# Patient Record
Sex: MALE | URBAN - METROPOLITAN AREA
[De-identification: names, ages, dates, MRNs, and addresses within clinical notes are randomized per-mention and may not be internally consistent; named-entity substitution may affect disease eponyms.]

---

## 2023-08-15 ENCOUNTER — INPATIENT (INPATIENT)
Facility: HOSPITAL | Age: 68
LOS: 1 days | Discharge: HOME | DRG: 313 | End: 2023-08-17
Attending: STUDENT IN AN ORGANIZED HEALTH CARE EDUCATION/TRAINING PROGRAM | Admitting: INTERNAL MEDICINE
Payer: COMMERCIAL

## 2023-08-15 VITALS
TEMPERATURE: 98 F | DIASTOLIC BLOOD PRESSURE: 76 MMHG | HEIGHT: 68 IN | HEART RATE: 78 BPM | RESPIRATION RATE: 16 BRPM | WEIGHT: 190.04 LBS | OXYGEN SATURATION: 100 % | SYSTOLIC BLOOD PRESSURE: 157 MMHG

## 2023-08-15 DIAGNOSIS — R07.9 CHEST PAIN, UNSPECIFIED: ICD-10-CM

## 2023-08-15 LAB
ALBUMIN SERPL ELPH-MCNC: 3.7 G/DL — SIGNIFICANT CHANGE UP (ref 3.3–5)
ALP SERPL-CCNC: 103 U/L — SIGNIFICANT CHANGE UP (ref 40–120)
ALT FLD-CCNC: 31 U/L — SIGNIFICANT CHANGE UP (ref 10–45)
ANION GAP SERPL CALC-SCNC: 10 MMOL/L — SIGNIFICANT CHANGE UP (ref 5–17)
APTT BLD: 33.2 SEC — SIGNIFICANT CHANGE UP (ref 24.5–35.6)
AST SERPL-CCNC: 22 U/L — SIGNIFICANT CHANGE UP (ref 10–40)
BASOPHILS # BLD AUTO: 0.06 K/UL — SIGNIFICANT CHANGE UP (ref 0–0.2)
BASOPHILS NFR BLD AUTO: 0.4 % — SIGNIFICANT CHANGE UP (ref 0–2)
BILIRUB SERPL-MCNC: 0.8 MG/DL — SIGNIFICANT CHANGE UP (ref 0.2–1.2)
BUN SERPL-MCNC: 14 MG/DL — SIGNIFICANT CHANGE UP (ref 7–23)
CALCIUM SERPL-MCNC: 9.8 MG/DL — SIGNIFICANT CHANGE UP (ref 8.4–10.5)
CHLORIDE SERPL-SCNC: 105 MMOL/L — SIGNIFICANT CHANGE UP (ref 96–108)
CO2 SERPL-SCNC: 25 MMOL/L — SIGNIFICANT CHANGE UP (ref 22–31)
CREAT SERPL-MCNC: 1.13 MG/DL — SIGNIFICANT CHANGE UP (ref 0.5–1.3)
D DIMER BLD IA.RAPID-MCNC: <150 NG/ML DDU — SIGNIFICANT CHANGE UP
EGFR: 70 ML/MIN/1.73M2 — SIGNIFICANT CHANGE UP
EOSINOPHIL # BLD AUTO: 0.28 K/UL — SIGNIFICANT CHANGE UP (ref 0–0.5)
EOSINOPHIL NFR BLD AUTO: 1.9 % — SIGNIFICANT CHANGE UP (ref 0–6)
GLUCOSE SERPL-MCNC: 122 MG/DL — HIGH (ref 70–99)
HCT VFR BLD CALC: 40.7 % — SIGNIFICANT CHANGE UP (ref 39–50)
HGB BLD-MCNC: 13.6 G/DL — SIGNIFICANT CHANGE UP (ref 13–17)
IMM GRANULOCYTES NFR BLD AUTO: 0.4 % — SIGNIFICANT CHANGE UP (ref 0–0.9)
INR BLD: 0.96 RATIO — SIGNIFICANT CHANGE UP (ref 0.85–1.18)
LYMPHOCYTES # BLD AUTO: 30.3 % — SIGNIFICANT CHANGE UP (ref 13–44)
LYMPHOCYTES # BLD AUTO: 4.5 K/UL — HIGH (ref 1–3.3)
MAGNESIUM SERPL-MCNC: 2.2 MG/DL — SIGNIFICANT CHANGE UP (ref 1.6–2.6)
MCHC RBC-ENTMCNC: 29.8 PG — SIGNIFICANT CHANGE UP (ref 27–34)
MCHC RBC-ENTMCNC: 33.4 GM/DL — SIGNIFICANT CHANGE UP (ref 32–36)
MCV RBC AUTO: 89.3 FL — SIGNIFICANT CHANGE UP (ref 80–100)
MONOCYTES # BLD AUTO: 1.2 K/UL — HIGH (ref 0–0.9)
MONOCYTES NFR BLD AUTO: 8.1 % — SIGNIFICANT CHANGE UP (ref 2–14)
NEUTROPHILS # BLD AUTO: 8.77 K/UL — HIGH (ref 1.8–7.4)
NEUTROPHILS NFR BLD AUTO: 58.9 % — SIGNIFICANT CHANGE UP (ref 43–77)
NRBC # BLD: 0 /100 WBCS — SIGNIFICANT CHANGE UP (ref 0–0)
NT-PROBNP SERPL-SCNC: 72 PG/ML — SIGNIFICANT CHANGE UP (ref 0–300)
PLATELET # BLD AUTO: 448 K/UL — HIGH (ref 150–400)
POTASSIUM SERPL-MCNC: 3.7 MMOL/L — SIGNIFICANT CHANGE UP (ref 3.5–5.3)
POTASSIUM SERPL-SCNC: 3.7 MMOL/L — SIGNIFICANT CHANGE UP (ref 3.5–5.3)
PROT SERPL-MCNC: 7.8 G/DL — SIGNIFICANT CHANGE UP (ref 6–8.3)
PROTHROM AB SERPL-ACNC: 11.3 SEC — SIGNIFICANT CHANGE UP (ref 9.5–13)
RBC # BLD: 4.56 M/UL — SIGNIFICANT CHANGE UP (ref 4.2–5.8)
RBC # FLD: 12.9 % — SIGNIFICANT CHANGE UP (ref 10.3–14.5)
SODIUM SERPL-SCNC: 140 MMOL/L — SIGNIFICANT CHANGE UP (ref 135–145)
TROPONIN I, HIGH SENSITIVITY RESULT: 4.7 NG/L — SIGNIFICANT CHANGE UP
TROPONIN I, HIGH SENSITIVITY RESULT: 5.7 NG/L — SIGNIFICANT CHANGE UP
WBC # BLD: 14.87 K/UL — HIGH (ref 3.8–10.5)
WBC # FLD AUTO: 14.87 K/UL — HIGH (ref 3.8–10.5)

## 2023-08-15 PROCEDURE — 93010 ELECTROCARDIOGRAM REPORT: CPT

## 2023-08-15 PROCEDURE — 71045 X-RAY EXAM CHEST 1 VIEW: CPT | Mod: 26

## 2023-08-15 PROCEDURE — 99222 1ST HOSP IP/OBS MODERATE 55: CPT

## 2023-08-15 PROCEDURE — 99285 EMERGENCY DEPT VISIT HI MDM: CPT

## 2023-08-15 RX ORDER — ASPIRIN/CALCIUM CARB/MAGNESIUM 324 MG
162 TABLET ORAL ONCE
Refills: 0 | Status: COMPLETED | OUTPATIENT
Start: 2023-08-15 | End: 2023-08-15

## 2023-08-15 RX ORDER — LOSARTAN POTASSIUM 100 MG/1
50 TABLET, FILM COATED ORAL DAILY
Refills: 0 | Status: DISCONTINUED | OUTPATIENT
Start: 2023-08-15 | End: 2023-08-17

## 2023-08-15 RX ORDER — METOPROLOL TARTRATE 50 MG
100 TABLET ORAL DAILY
Refills: 0 | Status: DISCONTINUED | OUTPATIENT
Start: 2023-08-15 | End: 2023-08-17

## 2023-08-15 RX ORDER — ACETAMINOPHEN 500 MG
650 TABLET ORAL EVERY 6 HOURS
Refills: 0 | Status: DISCONTINUED | OUTPATIENT
Start: 2023-08-15 | End: 2023-08-17

## 2023-08-15 RX ORDER — PANTOPRAZOLE SODIUM 20 MG/1
40 TABLET, DELAYED RELEASE ORAL
Refills: 0 | Status: DISCONTINUED | OUTPATIENT
Start: 2023-08-15 | End: 2023-08-17

## 2023-08-15 RX ORDER — REGADENOSON 0.08 MG/ML
0.4 INJECTION, SOLUTION INTRAVENOUS ONCE
Refills: 0 | Status: COMPLETED | OUTPATIENT
Start: 2023-08-15 | End: 2023-08-17

## 2023-08-15 RX ADMIN — Medication 162 MILLIGRAM(S): at 18:56

## 2023-08-15 NOTE — H&P ADULT - NSHPLABSRESULTS_GEN_ALL_CORE
13.6   14.87 )-----------( 448      ( 15 Aug 2023 18:55 )             40.7       08-15    140  |  105  |  14  ----------------------------<  122<H>  3.7   |  25  |  1.13    Ca    9.8      15 Aug 2023 18:55  Mg     2.2     08-15    TPro  7.8  /  Alb  3.7  /  TBili  0.8  /  DBili  x   /  AST  22  /  ALT  31  /  AlkPhos  103  08-15       Magnesium: 2.2 mg/dL [1.6 - 2.6] (08-15-23 @ 18:55)    LIVER FUNCTIONS - ( 15 Aug 2023 18:55 )  Alb: 3.7 g/dL / Pro: 7.8 g/dL / ALK PHOS: 103 U/L / ALT: 31 U/L / AST: 22 U/L / GGT: x               PT/INR - ( 15 Aug 2023 18:55 )   PT: 11.3 sec;   INR: 0.96 ratio         PTT - ( 15 Aug 2023 18:55 )  PTT:33.2 sec          Urinalysis Basic - ( 15 Aug 2023 18:55 )    Color: x / Appearance: x / SG: x / pH: x  Gluc: 122 mg/dL / Ketone: x  / Bili: x / Urobili: x   Blood: x / Protein: x / Nitrite: x   Leuk Esterase: x / RBC: x / WBC x   Sq Epi: x / Non Sq Epi: x / Bacteria: x        CAPILLARY BLOOD GLUCOSE        08-15 @ 18:55  <150 ng/mL DDU      EKG: personally rev. NSR at 76bpm, no acute ST changes.       CXR: wet read. personally rev.

## 2023-08-15 NOTE — H&P ADULT - NSHPPHYSICALEXAM_GEN_ALL_CORE
Vital Signs Last 24 Hrs  T(C): 37 (15 Aug 2023 21:56), Max: 37 (15 Aug 2023 21:56)  T(F): 98.6 (15 Aug 2023 21:56), Max: 98.6 (15 Aug 2023 21:56)  HR: 67 (15 Aug 2023 21:56) (67 - 78)  BP: 143/74 (15 Aug 2023 21:56) (143/74 - 157/76)  BP(mean): --  RR: 16 (15 Aug 2023 21:56) (16 - 16)  SpO2: 100% (15 Aug 2023 21:56) (100% - 100%)    Parameters below as of 15 Aug 2023 21:56  Patient On (Oxygen Delivery Method): room air      Daily Height in cm: 172.72 (15 Aug 2023 18:02)    Daily   CAPILLARY BLOOD GLUCOSE        I&O's Summary      GENERAL: NAD  HEAD:  Normocephalic  EYES: EOMI, PERRLA, conjunctiva and sclera clear  ENMT: No tonsillar erythema, exudates, or enlargement; Moist mucous membranes, No lesions  NECK: Supple, No JVD, no bruit, normal thyroid  NERVOUS SYSTEM:  Alert & Oriented X3, Good concentration; grossly  moves all fours; DTRs 2+ intact and symmetric  CHEST/LUNG: Clear to auscultation bilaterally; No rales, rhonchi, wheezing, or rubs  HEART: Regular rate and rhythm; No murmurs, rubs, or gallops  ABDOMEN: Soft, Nontender, Nondistended; Bowel sounds present  EXTREMITIES:  2+ Peripheral Pulses, No clubbing, cyanosis, or edema  LYMPH: No lymphadenopathy noted  SKIN: No rashes or lesions

## 2023-08-15 NOTE — H&P ADULT - NSHPREVIEWOFSYSTEMS_GEN_ALL_CORE
CONSTITUTIONAL: No fever, weight loss, or fatigue  EYES: No eye pain, visual disturbances, or discharge  ENMT:  No difficulty hearing, tinnitus, vertigo; No sinus or throat pain  NECK: No pain or stiffness  RESPIRATORY: No cough, wheezing, chills or hemoptysis; + shortness of breath  CARDIOVASCULAR: + chest pain, no palpitations, +dizziness, no  leg swelling  GASTROINTESTINAL: No abdominal or epigastric pain. + nausea, no vomiting, or hematemesis; No diarrhea or constipation. No melena or hematochezia.  GENITOURINARY: No dysuria, frequency, hematuria, or incontinence  NEUROLOGICAL: No headaches, memory loss, loss of strength, numbness, or tremors  SKIN: No itching, burning, rashes, or lesions   LYMPH NODES: No enlarged glands  ENDOCRINE: No heat or cold intolerance; No hair loss  MUSCULOSKELETAL: No joint pain or swelling; No muscle, back, or extremity pain  PSYCHIATRIC: No depression, anxiety, mood swings, or difficulty sleeping  HEME/LYMPH: No easy bruising, or bleeding gums  ALLERY AND IMMUNOLOGIC: No hives or eczema    IMPROVE VTE Individual Risk Assessment          RISK                                                          Points  [  ] Previous VTE                                                3  [  ] Thrombophilia                                             2  [  ] Lower limb paralysis                                   2        (unable to hold up >15 seconds)    [  ] Current Cancer                                             2         (within 6 months)  [  ] Immobilization > 24 hrs                              1  [  ] ICU/CCU stay > 24 hours                             1  [1  ] Age > 60                                                         1    IMPROVE VTE Score:         [ 1        ]    Total Risk Factor Score:    0 - 1:   Consider IPC  >2 - 3:  Thromboprophylaxis required (enoxaparin or SQ heparin)        >4:   High Risk: Thromboprophylaxis required (enoxaparin or SQ heparin), optional add IPC  **If CONTRAINDICATION to enoxaparin or SQ heparin, USE IPCs**

## 2023-08-15 NOTE — ED ADULT NURSE NOTE - NSFALLRISKINTERV_ED_ALL_ED

## 2023-08-15 NOTE — ED ADULT NURSE NOTE - OBJECTIVE STATEMENT
pt presents to ED c/o sob, L sided cp. Pt has had intermittent episodes of cp sob past 3 days. Pt son states sob lasting 5 hours prior to ED arrival. Pt pmh HTN. Pt denies f/c, trauma, chills.

## 2023-08-15 NOTE — H&P ADULT - ASSESSMENT
67M hx of HTN pw chest pain.    #Chest pain.  trend trops, serial EKGs, ECHO, card consult.  check nuclear stress (already ordered) in am if trop neg.   d-dimer neg.   ?GI related. start PPI.    #HTN  cont metoprolol and Losartan.    #DVT/GI proph    D/W Dr Farhan Geller at bedside updated. [phone] 67M hx of HTN pw chest pain.    #Chest pain.  trend trops, serial EKGs, ECHO, card consult.  check nuclear stress (already ordered) in am if trop neg.   d-dimer neg. check lipid profile  ?GI related. start PPI.    #HTN  cont metoprolol and Losartan.    #DVT/GI proph    D/W Dr Farhan Geller at bedside updated. [phone]

## 2023-08-15 NOTE — ED PROVIDER NOTE - PHYSICAL EXAMINATION
This message is for patient in regards to his/her appointment 08/15/22 at 10:00a   With CINDY Ruiz.      [facility] Policy: For the safety of all patients and staff members.     Patient Visitor policy: During this visit we're informing all patients that face mask are required.     If you have any questions or concerns please contact [phone]      Pt verbalized understanding and has  Confirmed  appt   General:     NAD, well-nourished, well-appearing  Eyes: PERRL  Head:     NC/AT, EOMI, oral mucosa moist  Neck:     trachea midline  Lungs:     CTA b/l, tachypnea in the high 20s low 30s  CVS:     RRR  Abd:     protuberant, soft and NT  Ext:   no deformities, no edema  Neuro: AAOx3

## 2023-08-15 NOTE — ED PROVIDER NOTE - CLINICAL SUMMARY MEDICAL DECISION MAKING FREE TEXT BOX
pt 68yo M hx HTN c/o SOB and left sided chest pain. 5 days ago pt had non radiating left sided cp at rest which resolved after a few minutes on its own. today had an episode this am which resolved on its own and now c/o constant discomfort and SOB. no edema. no cough. no fever, chills, no trauma. no recent illness. didn't take any meds for pain pt 66yo M hx HTN c/o SOB and left sided chest pain. 5 days ago pt had non radiating left sided cp at rest which resolved after a few minutes on its own. today had an episode this am which resolved on its own and now c/o constant discomfort and SOB. no edema. no cough. no fever, chills, no trauma. no recent illness. didn't take any meds for pain  2 trop neg. ekg NSR. will admit for ACS emerson pt 68yo M hx HTN c/o SOB and left sided chest pain. 5 days ago pt had non radiating left sided cp at rest which resolved after a few minutes on its own. today had an episode this am which resolved on its own and now c/o constant discomfort and SOB. no edema. no cough. no fever, chills, no trauma. no recent illness. didn't take any meds for pain  2 trop neg. ekg NSR. will admit for ACS emerson    Attending Xavier: 68 y/o M w/ PMH of HTN presenting w/ SOB. Seen w/ son. Agree w/ above documented HPI/PE/MDM. Pt w/ SOB and L side chest pain. Has been occurring for approx 1 week. Comes on randomly and self resolves. Today however symptoms came on and have been persistent. Pt overall well appearing, no acute distress. Lungs clear. HR regular. Will eval for ACS. Screen for PE. Eval for PNA but less likely. Eval for CHF. Given age, hx, and symptoms, recommended admission regardless of initial ED workup and pt agree. Plan for labs, imaging, EKG, meds. Will reassess the need for additional interventions as clinically warranted. Refer to any progress notes for updates on clinical course and as a continuation of this MDM.

## 2023-08-15 NOTE — ED PROVIDER NOTE - OBJECTIVE STATEMENT
pt 66yo M hx HTN c/o SOB and left sided chest pain. 5 days ago pt had non radiating left sided cp at rest which resolved after a few minutes on its own. today had an episode this am which resolved on its own and now c/o constant discomfort and SOB. no edema. no cough. no fever, chills, no trauma. no recent illness. didn't take any meds for pain

## 2023-08-15 NOTE — ED PROVIDER NOTE - ATTENDING APP SHARED VISIT CONTRIBUTION OF CARE
Attending Xavier: I performed a history and physical exam of the patient and discussed their management with the TAMIE. I have reviewed the TAMIE note and agree with the documented findings and plan of care, except as noted. This was a shared visit with an TAMIE. I reviewed and verified the documentation and independently performed my own history/exam/medical decision making. My medical decision making and observations are found above. Please refer to any progress notes for updates on clinical course. My notes supersedes the above ACP note in case of discrepancy

## 2023-08-15 NOTE — H&P ADULT - HISTORY OF PRESENT ILLNESS
67M hx of HTN pw chest pain. Pt related episode of moderate to severe chest pressure 8/10 5 days ago started 30min after eating breakfast associated with SOB, nausea, mild diaphoresis, weakness lasted about 30 min and resolved on its own. Recurrent episode also after breakfast 2 days ago lasting about an hr with same sxs. Today after breakfast at 10am with recurrent sxs but unresolved after several hrs and came to ED. Sxs much milder now but sensation of abd bloating and constant burping. Denied any GERD sxs. Related recent R lumbar radicular sxs and was on regimen of Naproxen, tramadol and gabapentin which he has been off for about a week due to improvement. In ED, afebrile P: 78 BP: 157/76 sat 100% on RA  WBC: 14.87 59%N Hgb; 13.6 cr: 1.17 d-dimer neg trop: 4.7 repeat 5.7 ma.2 BNP: 72

## 2023-08-16 ENCOUNTER — TRANSCRIPTION ENCOUNTER (OUTPATIENT)
Age: 68
End: 2023-08-16

## 2023-08-16 LAB
ALBUMIN SERPL ELPH-MCNC: 3.7 G/DL — SIGNIFICANT CHANGE UP (ref 3.3–5)
ALP SERPL-CCNC: 97 U/L — SIGNIFICANT CHANGE UP (ref 40–120)
ALT FLD-CCNC: 32 U/L — SIGNIFICANT CHANGE UP (ref 10–45)
ANION GAP SERPL CALC-SCNC: 9 MMOL/L — SIGNIFICANT CHANGE UP (ref 5–17)
AST SERPL-CCNC: 22 U/L — SIGNIFICANT CHANGE UP (ref 10–40)
BASOPHILS # BLD AUTO: 0.08 K/UL — SIGNIFICANT CHANGE UP (ref 0–0.2)
BASOPHILS NFR BLD AUTO: 0.6 % — SIGNIFICANT CHANGE UP (ref 0–2)
BILIRUB SERPL-MCNC: 1.2 MG/DL — SIGNIFICANT CHANGE UP (ref 0.2–1.2)
BUN SERPL-MCNC: 13 MG/DL — SIGNIFICANT CHANGE UP (ref 7–23)
CALCIUM SERPL-MCNC: 9 MG/DL — SIGNIFICANT CHANGE UP (ref 8.4–10.5)
CHLORIDE SERPL-SCNC: 106 MMOL/L — SIGNIFICANT CHANGE UP (ref 96–108)
CHOLEST SERPL-MCNC: 158 MG/DL — SIGNIFICANT CHANGE UP
CO2 SERPL-SCNC: 28 MMOL/L — SIGNIFICANT CHANGE UP (ref 22–31)
CREAT SERPL-MCNC: 1 MG/DL — SIGNIFICANT CHANGE UP (ref 0.5–1.3)
EGFR: 81 ML/MIN/1.73M2 — SIGNIFICANT CHANGE UP
EOSINOPHIL # BLD AUTO: 0.45 K/UL — SIGNIFICANT CHANGE UP (ref 0–0.5)
EOSINOPHIL NFR BLD AUTO: 3.6 % — SIGNIFICANT CHANGE UP (ref 0–6)
GLUCOSE SERPL-MCNC: 128 MG/DL — HIGH (ref 70–99)
HCT VFR BLD CALC: 44.8 % — SIGNIFICANT CHANGE UP (ref 39–50)
HCV AB S/CO SERPL IA: 0.12 S/CO — SIGNIFICANT CHANGE UP (ref 0–0.99)
HCV AB SERPL-IMP: SIGNIFICANT CHANGE UP
HDLC SERPL-MCNC: 40 MG/DL — LOW
HGB BLD-MCNC: 14.6 G/DL — SIGNIFICANT CHANGE UP (ref 13–17)
IMM GRANULOCYTES NFR BLD AUTO: 0.4 % — SIGNIFICANT CHANGE UP (ref 0–0.9)
LIPID PNL WITH DIRECT LDL SERPL: 98 MG/DL — SIGNIFICANT CHANGE UP
LYMPHOCYTES # BLD AUTO: 3.82 K/UL — HIGH (ref 1–3.3)
LYMPHOCYTES # BLD AUTO: 30.4 % — SIGNIFICANT CHANGE UP (ref 13–44)
MAGNESIUM SERPL-MCNC: 2.1 MG/DL — SIGNIFICANT CHANGE UP (ref 1.6–2.6)
MCHC RBC-ENTMCNC: 29.9 PG — SIGNIFICANT CHANGE UP (ref 27–34)
MCHC RBC-ENTMCNC: 32.6 GM/DL — SIGNIFICANT CHANGE UP (ref 32–36)
MCV RBC AUTO: 91.8 FL — SIGNIFICANT CHANGE UP (ref 80–100)
MONOCYTES # BLD AUTO: 1.17 K/UL — HIGH (ref 0–0.9)
MONOCYTES NFR BLD AUTO: 9.3 % — SIGNIFICANT CHANGE UP (ref 2–14)
NEUTROPHILS # BLD AUTO: 6.98 K/UL — SIGNIFICANT CHANGE UP (ref 1.8–7.4)
NEUTROPHILS NFR BLD AUTO: 55.7 % — SIGNIFICANT CHANGE UP (ref 43–77)
NON HDL CHOLESTEROL: 117 MG/DL — SIGNIFICANT CHANGE UP
NRBC # BLD: 0 /100 WBCS — SIGNIFICANT CHANGE UP (ref 0–0)
PLATELET # BLD AUTO: 479 K/UL — HIGH (ref 150–400)
POTASSIUM SERPL-MCNC: 4.3 MMOL/L — SIGNIFICANT CHANGE UP (ref 3.5–5.3)
POTASSIUM SERPL-SCNC: 4.3 MMOL/L — SIGNIFICANT CHANGE UP (ref 3.5–5.3)
PROT SERPL-MCNC: 7.4 G/DL — SIGNIFICANT CHANGE UP (ref 6–8.3)
RBC # BLD: 4.88 M/UL — SIGNIFICANT CHANGE UP (ref 4.2–5.8)
RBC # FLD: 12.9 % — SIGNIFICANT CHANGE UP (ref 10.3–14.5)
SODIUM SERPL-SCNC: 143 MMOL/L — SIGNIFICANT CHANGE UP (ref 135–145)
TRIGL SERPL-MCNC: 107 MG/DL — SIGNIFICANT CHANGE UP
TROPONIN I, HIGH SENSITIVITY RESULT: 9.4 NG/L — SIGNIFICANT CHANGE UP
WBC # BLD: 12.55 K/UL — HIGH (ref 3.8–10.5)
WBC # FLD AUTO: 12.55 K/UL — HIGH (ref 3.8–10.5)

## 2023-08-16 PROCEDURE — 99233 SBSQ HOSP IP/OBS HIGH 50: CPT

## 2023-08-16 PROCEDURE — 93306 TTE W/DOPPLER COMPLETE: CPT | Mod: 26

## 2023-08-16 PROCEDURE — 93010 ELECTROCARDIOGRAM REPORT: CPT

## 2023-08-16 PROCEDURE — 99222 1ST HOSP IP/OBS MODERATE 55: CPT

## 2023-08-16 RX ORDER — ASPIRIN/CALCIUM CARB/MAGNESIUM 324 MG
81 TABLET ORAL DAILY
Refills: 0 | Status: DISCONTINUED | OUTPATIENT
Start: 2023-08-16 | End: 2023-08-17

## 2023-08-16 RX ADMIN — PANTOPRAZOLE SODIUM 40 MILLIGRAM(S): 20 TABLET, DELAYED RELEASE ORAL at 00:41

## 2023-08-16 RX ADMIN — Medication 81 MILLIGRAM(S): at 21:51

## 2023-08-16 RX ADMIN — PANTOPRAZOLE SODIUM 40 MILLIGRAM(S): 20 TABLET, DELAYED RELEASE ORAL at 06:06

## 2023-08-16 RX ADMIN — LOSARTAN POTASSIUM 50 MILLIGRAM(S): 100 TABLET, FILM COATED ORAL at 06:06

## 2023-08-16 NOTE — DISCHARGE NOTE NURSING/CASE MANAGEMENT/SOCIAL WORK - PATIENT PORTAL LINK FT
You can access the FollowMyHealth Patient Portal offered by [facility] by registering at the following website: [email or web address]. By joining [facility]’s FollowMyHealth portal, you will also be able to view your health information using other applications (apps) compatible with our system.

## 2023-08-16 NOTE — CONSULT NOTE ADULT - ASSESSMENT
Assessment:  Dave Elena is a 67 year old man with past medical history of Hypertension presents with shortness of breath.    The patient reports that several days ago he started to experience shortness of breath at rest and on exertion. Denies chest pain. ECG consistent with normal sinus rhythm, no acute ischemic ST abnormalities. Troponins negative x 3, no signs of an acute coronary syndrome. D-dimer negative. Pro BNP negative, no signs of CHF on exam.     Recommendations:  [] Dyspnea: Due to age and risk factors, agree with checking echocardiogram to evaluate for structural heart disease. Based on echo results, plan for possible pharmacologic nuclear stress test in AM to evaluate for ischemic heart disease, please make NPO after MN tonight. Follow up Chest xray report.  [] Hypertension: BP elevated, resume home meds. Follow up echo     Updated Dr. Cooley. Will sign out this case to cardiologist to follow along tomorrow.    Aide Shore MD  Cardiology

## 2023-08-16 NOTE — CONSULT NOTE ADULT - SUBJECTIVE AND OBJECTIVE BOX
PIEDAD VAN  [id number]      HPI:  67M hx of HTN pw chest pain. Pt related episode of moderate to severe chest pressure 8/10 5 days ago started 30min after eating breakfast associated with SOB, nausea, mild diaphoresis, weakness lasted about 30 min and resolved on its own. Recurrent episode also after breakfast 2 days ago lasting about an hr with same sxs. Today after breakfast at 10am with recurrent sxs but unresolved after several hrs and came to ED. Sxs much milder now but sensation of abd bloating and constant burping. Denied any GERD sxs. Related recent R lumbar radicular sxs and was on regimen of Naproxen, tramadol and gabapentin which he has been off for about a week due to improvement. In ED, afebrile P: 78 BP: 157/76 sat 100% on RA  WBC: 14.87 59%N Hgb; 13.6 cr: 1.17 d-dimer neg trop: 4.7 repeat 5.7 ma.2 BNP: 72  (15 Aug 2023 22:37)        ALLERGIES:  No Known Allergies      PAST MEDICAL & SURGICAL HISTORY:  SOB (shortness of breath)            CURRENT MEDICATIONS:  acetaminophen     Tablet .. 650 milliGRAM(s) Oral every 6 hours PRN  losartan 50 milliGRAM(s) Oral daily  metoprolol succinate  milliGRAM(s) Oral daily  pantoprazole    Tablet 40 milliGRAM(s) Oral before breakfast  regadenoson Injectable 0.4 milliGRAM(s) IV Push once      SOCIAL HISTORY:      FAMILY HISTORY:  No pertinent family history in first degree relatives        ROS:  All 10 systems reviewed and positives noted in HPI    OBJECTIVE:    VITAL SIGNS:  Vital Signs Last 24 Hrs  T(C): 36.6 (16 Aug 2023 05:20), Max: 37 (15 Aug 2023 21:56)  T(F): 97.9 (16 Aug 2023 05:20), Max: 98.6 (15 Aug 2023 21:56)  HR: 85 (16 Aug 2023 05:20) (67 - 87)  BP: 168/77 (16 Aug 2023 05:20) (143/74 - 203/95)  BP(mean): --  RR: 17 (16 Aug 2023 05:20) (16 - 18)  SpO2: 97% (16 Aug 2023 05:20) (97% - 100%)    Parameters below as of 16 Aug 2023 05:20  Patient On (Oxygen Delivery Method): room air        PHYSICAL EXAM:  General: well appearing, no distress  HEENT: sclera anicteric  Neck: supple, no carotid bruits b/l  CVS: JVP ~ 7 cm H20, RRR, s1, s2, no murmurs/rubs/gallops  Chest: unlabored respirations, clear to auscultation b/l  Abdomen: non-distended  Extremities: no lower extremity edema b/l  Neuro: awake, alert & oriented x 3  Psych: normal affect      LABS:                        14.6   12.55 )-----------( 479      ( 16 Aug 2023 06:47 )             44.8     08-16    143  |  106  |  13  ----------------------------<  128<H>  4.3   |  28  |  1.00    Ca    9.0      16 Aug 2023 06:47  Mg     2.1     08-16    TPro  7.4  /  Alb  3.7  /  TBili  1.2  /  DBili  x   /  AST  22  /  ALT  32  /  AlkPhos  97  08-16        PT/INR - ( 15 Aug 2023 18:55 )   PT: 11.3 sec;   INR: 0.96 ratio         PTT - ( 15 Aug 2023 18:55 )  PTT:33.2 sec      ECG:      TTE:     DAVE ARAUJO  [id number]    [facility]  ID#: [id number]    HPI:    Dave Araujo is a 67 year old man with past medical history of Hypertension presents with shortness of breath.    The patient reports that several days ago he started to experience shortness of breath at rest and on exertion. Denies chest pain. Denies dizziness or syncope. Denies prior history of myocardial infarction.       ALLERGIES:  No Known Allergies      PAST MEDICAL HISTORY:  Hypertension     CURRENT MEDICATIONS:  acetaminophen     Tablet .. 650 milliGRAM(s) Oral every 6 hours PRN  losartan 50 milliGRAM(s) Oral daily  metoprolol succinate  milliGRAM(s) Oral daily  pantoprazole    Tablet 40 milliGRAM(s) Oral before breakfast  regadenoson Injectable 0.4 milliGRAM(s) IV Push once      ROS:  All 10 systems reviewed and positives noted in HPI    OBJECTIVE:    VITAL SIGNS:  Vital Signs Last 24 Hrs  T(C): 36.6 (16 Aug 2023 05:20), Max: 37 (15 Aug 2023 21:56)  T(F): 97.9 (16 Aug 2023 05:20), Max: 98.6 (15 Aug 2023 21:56)  HR: 85 (16 Aug 2023 05:20) (67 - 87)  BP: 168/77 (16 Aug 2023 05:20) (143/74 - 203/95)  BP(mean): --  RR: 17 (16 Aug 2023 05:20) (16 - 18)  SpO2: 97% (16 Aug 2023 05:20) (97% - 100%)    Parameters below as of 16 Aug 2023 05:20  Patient On (Oxygen Delivery Method): room air        PHYSICAL EXAM:  General: no distress  HEENT: sclera anicteric  Neck: supple  CVS: JVP ~ 7 cm H20, RRR, s1, s2, no murmurs/rubs/gallops  Chest: unlabored respirations, clear to auscultation b/l  Abdomen: non-distended  Extremities: no lower extremity edema b/l  Neuro: awake, alert & oriented  Psych: normal affect      LABS:                        14.6   12.55 )-----------( 479      ( 16 Aug 2023 06:47 )             44.8     08-16    143  |  106  |  13  ----------------------------<  128<H>  4.3   |  28  |  1.00    Ca    9.0      16 Aug 2023 06:47  Mg     2.1     08-16    TPro  7.4  /  Alb  3.7  /  TBili  1.2  /  DBili  x   /  AST  22  /  ALT  32  /  AlkPhos  97  08-16        PT/INR - ( 15 Aug 2023 18:55 )   PT: 11.3 sec;   INR: 0.96 ratio         PTT - ( 15 Aug 2023 18:55 )  PTT:33.2 sec        ECG (8/15/23): normal sinus rhythm

## 2023-08-16 NOTE — PROGRESS NOTE ADULT - SUBJECTIVE AND OBJECTIVE BOX
Patient is a 67y old  Male who presents with a chief complaint of chest pain (16 Aug 2023 10:25)      INTERVAL HPI:  OVERNIGHT EVENTS:  T(F): 97.7 (08-16-23 @ 12:24), Max: 98.6 (08-15-23 @ 21:56)  HR: 88 (08-16-23 @ 12:24) (67 - 88)  BP: 133/75 (08-16-23 @ 12:24) (133/75 - 203/95)  RR: 18 (08-16-23 @ 12:24) (16 - 18)  SpO2: 97% (08-16-23 @ 12:24) (97% - 100%)  I&O's Summary        PHYSICAL EXAM:  GENERAL: NAD, well-groomed, well-developed  HEAD:  Atraumatic, Normocephalic  EYES: EOMI, PERRLA, conjunctiva and sclera clear  ENMT: No tonsillar erythema, exudates, or enlargement; Moist mucous membranes, Good dentition, No lesions  NECK: Supple, No JVD, Normal thyroid  NERVOUS SYSTEM:  Alert & Oriented X3, Good concentration; Motor Strength 5/5 B/L upper and lower extremities; DTRs 2+ intact and symmetric  CHEST/LUNG: Clear to percussion bilaterally; No rales, rhonchi, wheezing, or rubs  HEART: Regular rate and rhythm; No murmurs, rubs, or gallops  ABDOMEN: Soft, Nontender, Nondistended; Bowel sounds present  EXTREMITIES:  2+ Peripheral Pulses, No clubbing, cyanosis, or edema  LYMPH: No lymphadenopathy noted  SKIN: No rashes or lesions    LABS:                        14.6   12.55 )-----------( 479      ( 16 Aug 2023 06:47 )             44.8     08-16    143  |  106  |  13  ----------------------------<  128<H>  4.3   |  28  |  1.00    Ca    9.0      16 Aug 2023 06:47  Mg     2.1     08-16    TPro  7.4  /  Alb  3.7  /  TBili  1.2  /  DBili  x   /  AST  22  /  ALT  32  /  AlkPhos  97  08-16    PT/INR - ( 15 Aug 2023 18:55 )   PT: 11.3 sec;   INR: 0.96 ratio         PTT - ( 15 Aug 2023 18:55 )  PTT:33.2 sec  Urinalysis Basic - ( 16 Aug 2023 06:47 )    Color: x / Appearance: x / SG: x / pH: x  Gluc: 128 mg/dL / Ketone: x  / Bili: x / Urobili: x   Blood: x / Protein: x / Nitrite: x   Leuk Esterase: x / RBC: x / WBC x   Sq Epi: x / Non Sq Epi: x / Bacteria: x      CAPILLARY BLOOD GLUCOSE                  MEDICATIONS  (STANDING):  losartan 50 milliGRAM(s) Oral daily  metoprolol succinate  milliGRAM(s) Oral daily  pantoprazole    Tablet 40 milliGRAM(s) Oral before breakfast  regadenoson Injectable 0.4 milliGRAM(s) IV Push once    MEDICATIONS  (PRN):  acetaminophen     Tablet .. 650 milliGRAM(s) Oral every 6 hours PRN Mild Pain (1 - 3)       Patient is a 67y old  Male who presents with a chief complaint of chest pain (16 Aug 2023 10:25)    [facility]  # [id number]    INTERVAL HPI: Pt seen and examined. States prior to admission had some irregularity in his breathing and was concerned along with some heartburn like symptoms. Vito Geller updated multiple times at bedside and on floor on plan of care. Pt denies any other acute complaints at this time.     OVERNIGHT EVENTS: none noted  T(F): 97.7 (08-16-23 @ 12:24), Max: 98.6 (08-15-23 @ 21:56)  HR: 88 (08-16-23 @ 12:24) (67 - 88)  BP: 133/75 (08-16-23 @ 12:24) (133/75 - 203/95)  RR: 18 (08-16-23 @ 12:24) (16 - 18)  SpO2: 97% (08-16-23 @ 12:24) (97% - 100%)  I&O's Summary        PHYSICAL EXAM:  GENERAL: NAD, obese  HEAD:  Normocephalic  EYES: EOMI, conjunctiva and sclera clear  ENMT:  Moist mucous membranes, No lesions  NECK: Supple, No JVD, no bruit,   NERVOUS SYSTEM:  Alert & Oriented X3, Good concentration; grossly  moves all fours; DTRs 2+ intact and symmetric  CHEST/LUNG: Clear to auscultation bilaterally; No rales, rhonchi, wheezing, or rubs  HEART: Regular rate and rhythm; No murmurs, rubs, or gallops  ABDOMEN: Soft, Nontender, Nondistended; Bowel sounds present  EXTREMITIES:  2+ Peripheral Pulses, No clubbing, cyanosis, or edema  SKIN: No rashes or lesions    LABS:                        14.6   12.55 )-----------( 479      ( 16 Aug 2023 06:47 )             44.8     08-16    143  |  106  |  13  ----------------------------<  128<H>  4.3   |  28  |  1.00    Ca    9.0      16 Aug 2023 06:47  Mg     2.1     08-16    TPro  7.4  /  Alb  3.7  /  TBili  1.2  /  DBili  x   /  AST  22  /  ALT  32  /  AlkPhos  97  08-16    PT/INR - ( 15 Aug 2023 18:55 )   PT: 11.3 sec;   INR: 0.96 ratio         PTT - ( 15 Aug 2023 18:55 )  PTT:33.2 sec  Urinalysis Basic - ( 16 Aug 2023 06:47 )    Color: x / Appearance: x / SG: x / pH: x  Gluc: 128 mg/dL / Ketone: x  / Bili: x / Urobili: x   Blood: x / Protein: x / Nitrite: x   Leuk Esterase: x / RBC: x / WBC x   Sq Epi: x / Non Sq Epi: x / Bacteria: x      CAPILLARY BLOOD GLUCOSE                  MEDICATIONS  (STANDING):  losartan 50 milliGRAM(s) Oral daily  metoprolol succinate  milliGRAM(s) Oral daily  pantoprazole    Tablet 40 milliGRAM(s) Oral before breakfast  regadenoson Injectable 0.4 milliGRAM(s) IV Push once    MEDICATIONS  (PRN):  acetaminophen     Tablet .. 650 milliGRAM(s) Oral every 6 hours PRN Mild Pain (1 - 3)

## 2023-08-16 NOTE — PATIENT PROFILE ADULT - FUNCTIONAL ASSESSMENT - DAILY ACTIVITY 4.
Called patient to schedule a screening mammogram PATIENTPHONEMESSAGE: left message.-          4 = No assist / stand by assistance

## 2023-08-16 NOTE — DISCHARGE NOTE NURSING/CASE MANAGEMENT/SOCIAL WORK - NSDCFUADDAPPT_GEN_ALL_CORE_FT
We made you a hospital followup appointment with Dr. Arteaga on 8/21/23 at 1:00pm, office #[phone].

## 2023-08-16 NOTE — PROGRESS NOTE ADULT - ASSESSMENT
67M hx of HTN pw chest pain.    #Chest pain.  trend trops, serial EKGs, ECHO, card consult.  cardio to consider pharm stress test tomorrow pending echo read, made npo tonight  ddime neg, lipid panel reviewed, check a1c, cont ppi,    #HTN  cont metoprolol and Losartan.    #DVT/GI proph    D/W Dr Shore cardio  Son Yimi at bedside updated. [phone]

## 2023-08-17 ENCOUNTER — TRANSCRIPTION ENCOUNTER (OUTPATIENT)
Age: 68
End: 2023-08-17

## 2023-08-17 VITALS
DIASTOLIC BLOOD PRESSURE: 84 MMHG | SYSTOLIC BLOOD PRESSURE: 135 MMHG | TEMPERATURE: 98 F | RESPIRATION RATE: 17 BRPM | HEART RATE: 83 BPM | OXYGEN SATURATION: 97 %

## 2023-08-17 DIAGNOSIS — K21.9 GASTRO-ESOPHAGEAL REFLUX DISEASE WITHOUT ESOPHAGITIS: ICD-10-CM

## 2023-08-17 DIAGNOSIS — R07.9 CHEST PAIN, UNSPECIFIED: ICD-10-CM

## 2023-08-17 DIAGNOSIS — I10 ESSENTIAL (PRIMARY) HYPERTENSION: ICD-10-CM

## 2023-08-17 LAB
A1C WITH ESTIMATED AVERAGE GLUCOSE RESULT: 6.2 % — HIGH (ref 4–5.6)
ALBUMIN SERPL ELPH-MCNC: 3.6 G/DL — SIGNIFICANT CHANGE UP (ref 3.3–5)
ALP SERPL-CCNC: 97 U/L — SIGNIFICANT CHANGE UP (ref 40–120)
ALT FLD-CCNC: 29 U/L — SIGNIFICANT CHANGE UP (ref 10–45)
ANION GAP SERPL CALC-SCNC: 12 MMOL/L — SIGNIFICANT CHANGE UP (ref 5–17)
AST SERPL-CCNC: 22 U/L — SIGNIFICANT CHANGE UP (ref 10–40)
BASOPHILS # BLD AUTO: 0.06 K/UL — SIGNIFICANT CHANGE UP (ref 0–0.2)
BASOPHILS NFR BLD AUTO: 0.5 % — SIGNIFICANT CHANGE UP (ref 0–2)
BILIRUB SERPL-MCNC: 1.2 MG/DL — SIGNIFICANT CHANGE UP (ref 0.2–1.2)
BUN SERPL-MCNC: 10 MG/DL — SIGNIFICANT CHANGE UP (ref 7–23)
CALCIUM SERPL-MCNC: 9.3 MG/DL — SIGNIFICANT CHANGE UP (ref 8.4–10.5)
CHLORIDE SERPL-SCNC: 105 MMOL/L — SIGNIFICANT CHANGE UP (ref 96–108)
CO2 SERPL-SCNC: 23 MMOL/L — SIGNIFICANT CHANGE UP (ref 22–31)
CREAT SERPL-MCNC: 1 MG/DL — SIGNIFICANT CHANGE UP (ref 0.5–1.3)
EGFR: 81 ML/MIN/1.73M2 — SIGNIFICANT CHANGE UP
EOSINOPHIL # BLD AUTO: 0.34 K/UL — SIGNIFICANT CHANGE UP (ref 0–0.5)
EOSINOPHIL NFR BLD AUTO: 2.7 % — SIGNIFICANT CHANGE UP (ref 0–6)
ESTIMATED AVERAGE GLUCOSE: 131 MG/DL — HIGH (ref 68–114)
GLUCOSE SERPL-MCNC: 134 MG/DL — HIGH (ref 70–99)
HCT VFR BLD CALC: 43.2 % — SIGNIFICANT CHANGE UP (ref 39–50)
HGB BLD-MCNC: 14.4 G/DL — SIGNIFICANT CHANGE UP (ref 13–17)
IMM GRANULOCYTES NFR BLD AUTO: 0.2 % — SIGNIFICANT CHANGE UP (ref 0–0.9)
LYMPHOCYTES # BLD AUTO: 26.6 % — SIGNIFICANT CHANGE UP (ref 13–44)
LYMPHOCYTES # BLD AUTO: 3.32 K/UL — HIGH (ref 1–3.3)
MCHC RBC-ENTMCNC: 30.3 PG — SIGNIFICANT CHANGE UP (ref 27–34)
MCHC RBC-ENTMCNC: 33.3 GM/DL — SIGNIFICANT CHANGE UP (ref 32–36)
MCV RBC AUTO: 90.8 FL — SIGNIFICANT CHANGE UP (ref 80–100)
MONOCYTES # BLD AUTO: 0.96 K/UL — HIGH (ref 0–0.9)
MONOCYTES NFR BLD AUTO: 7.7 % — SIGNIFICANT CHANGE UP (ref 2–14)
NEUTROPHILS # BLD AUTO: 7.77 K/UL — HIGH (ref 1.8–7.4)
NEUTROPHILS NFR BLD AUTO: 62.3 % — SIGNIFICANT CHANGE UP (ref 43–77)
NRBC # BLD: 0 /100 WBCS — SIGNIFICANT CHANGE UP (ref 0–0)
PLATELET # BLD AUTO: 444 K/UL — HIGH (ref 150–400)
POTASSIUM SERPL-MCNC: 3.5 MMOL/L — SIGNIFICANT CHANGE UP (ref 3.5–5.3)
POTASSIUM SERPL-SCNC: 3.5 MMOL/L — SIGNIFICANT CHANGE UP (ref 3.5–5.3)
PROT SERPL-MCNC: 7.6 G/DL — SIGNIFICANT CHANGE UP (ref 6–8.3)
RBC # BLD: 4.76 M/UL — SIGNIFICANT CHANGE UP (ref 4.2–5.8)
RBC # FLD: 13.2 % — SIGNIFICANT CHANGE UP (ref 10.3–14.5)
SODIUM SERPL-SCNC: 140 MMOL/L — SIGNIFICANT CHANGE UP (ref 135–145)
WBC # BLD: 12.48 K/UL — HIGH (ref 3.8–10.5)
WBC # FLD AUTO: 12.48 K/UL — HIGH (ref 3.8–10.5)

## 2023-08-17 PROCEDURE — 84484 ASSAY OF TROPONIN QUANT: CPT

## 2023-08-17 PROCEDURE — 85025 COMPLETE CBC W/AUTO DIFF WBC: CPT

## 2023-08-17 PROCEDURE — 93005 ELECTROCARDIOGRAM TRACING: CPT

## 2023-08-17 PROCEDURE — 83036 HEMOGLOBIN GLYCOSYLATED A1C: CPT

## 2023-08-17 PROCEDURE — 78452 HT MUSCLE IMAGE SPECT MULT: CPT | Mod: 26

## 2023-08-17 PROCEDURE — 80061 LIPID PANEL: CPT

## 2023-08-17 PROCEDURE — 93018 CV STRESS TEST I&R ONLY: CPT

## 2023-08-17 PROCEDURE — 85610 PROTHROMBIN TIME: CPT

## 2023-08-17 PROCEDURE — 36415 COLL VENOUS BLD VENIPUNCTURE: CPT

## 2023-08-17 PROCEDURE — 71045 X-RAY EXAM CHEST 1 VIEW: CPT

## 2023-08-17 PROCEDURE — 80053 COMPREHEN METABOLIC PANEL: CPT

## 2023-08-17 PROCEDURE — 36000 PLACE NEEDLE IN VEIN: CPT

## 2023-08-17 PROCEDURE — 93306 TTE W/DOPPLER COMPLETE: CPT

## 2023-08-17 PROCEDURE — 99239 HOSP IP/OBS DSCHRG MGMT >30: CPT

## 2023-08-17 PROCEDURE — 99232 SBSQ HOSP IP/OBS MODERATE 35: CPT | Mod: 25

## 2023-08-17 PROCEDURE — A9500: CPT

## 2023-08-17 PROCEDURE — 99285 EMERGENCY DEPT VISIT HI MDM: CPT

## 2023-08-17 PROCEDURE — 93017 CV STRESS TEST TRACING ONLY: CPT

## 2023-08-17 PROCEDURE — 83880 ASSAY OF NATRIURETIC PEPTIDE: CPT

## 2023-08-17 PROCEDURE — 93016 CV STRESS TEST SUPVJ ONLY: CPT

## 2023-08-17 PROCEDURE — 86803 HEPATITIS C AB TEST: CPT

## 2023-08-17 PROCEDURE — 78452 HT MUSCLE IMAGE SPECT MULT: CPT

## 2023-08-17 PROCEDURE — 85730 THROMBOPLASTIN TIME PARTIAL: CPT

## 2023-08-17 PROCEDURE — 83735 ASSAY OF MAGNESIUM: CPT

## 2023-08-17 PROCEDURE — 85379 FIBRIN DEGRADATION QUANT: CPT

## 2023-08-17 RX ORDER — PANTOPRAZOLE SODIUM 20 MG/1
1 TABLET, DELAYED RELEASE ORAL
Qty: 14 | Refills: 0
Start: 2023-08-17 | End: 2023-08-30

## 2023-08-17 RX ADMIN — LOSARTAN POTASSIUM 50 MILLIGRAM(S): 100 TABLET, FILM COATED ORAL at 05:38

## 2023-08-17 RX ADMIN — Medication 100 MILLIGRAM(S): at 05:39

## 2023-08-17 RX ADMIN — PANTOPRAZOLE SODIUM 40 MILLIGRAM(S): 20 TABLET, DELAYED RELEASE ORAL at 05:39

## 2023-08-17 RX ADMIN — REGADENOSON 0.4 MILLIGRAM(S): 0.08 INJECTION, SOLUTION INTRAVENOUS at 10:55

## 2023-08-17 NOTE — DISCHARGE NOTE PROVIDER - NSDCCAREPROVSEEN_GEN_ALL_CORE_FT
all other ROS negative except as per HPI Neel, Placido Mckinley, Wilian Cooley, Nathanael Tenroio, [place] Dillon

## 2023-08-17 NOTE — DISCHARGE NOTE PROVIDER - NSDCFUADDAPPT_GEN_ALL_CORE_FT
We made you a hospital followup appointment with Dr. Arteaga on 8/21/23 at 1:00pm, office #[phone].

## 2023-08-17 NOTE — PROGRESS NOTE ADULT - SUBJECTIVE AND OBJECTIVE BOX
Follow up for sob  SUBJ:    pain free sob free    PMH  SOB (shortness of breath)        MEDICATIONS  (STANDING):  aspirin  chewable 81 milliGRAM(s) Oral daily  losartan 50 milliGRAM(s) Oral daily  metoprolol succinate  milliGRAM(s) Oral daily  pantoprazole    Tablet 40 milliGRAM(s) Oral before breakfast    MEDICATIONS  (PRN):  acetaminophen     Tablet .. 650 milliGRAM(s) Oral every 6 hours PRN Mild Pain (1 - 3)        PHYSICAL EXAM:  Vital Signs Last 24 Hrs  T(C): 36.7 (17 Aug 2023 12:57), Max: 36.9 (17 Aug 2023 05:10)  T(F): 98 (17 Aug 2023 12:57), Max: 98.5 (17 Aug 2023 05:10)  HR: 83 (17 Aug 2023 12:57) (80 - 91)  BP: 135/84 (17 Aug 2023 12:57) (135/84 - 184/83)  BP(mean): 117 (17 Aug 2023 05:10) (117 - 117)  RR: 17 (17 Aug 2023 12:57) (16 - 18)  SpO2: 97% (17 Aug 2023 12:57) (97% - 98%)    Parameters below as of 17 Aug 2023 12:57  Patient On (Oxygen Delivery Method): room air        GENERAL: NAD, well-groomed, well-developed  HEAD:  Atraumatic, Normocephalic  EYES: EOMI, PERRLA, conjunctiva and sclera clear  ENT: Moist mucous membranes,  NECK: Supple, No JVD, no bruits  CHEST/LUNG: Clear to percussion bilaterally; No rales, rhonchi, wheezing, or rubs  HEART: Regular rate and rhythm; No murmurs, rubs, or gallops PMI non displaced.  ABDOMEN: Soft, Nontender, Nondistended; Bowel sounds present  EXTREMITIES:  2+ Peripheral Pulses, No clubbing, cyanosis, or edema  SKIN: No rashes or lesions  NERVOUS SYSTEM:  Cranial Nerves II-XII intact      TELEMETRY:    ECG:  ECHO:    LABS:                        14.4   12.48 )-----------( 444      ( 17 Aug 2023 06:31 )             43.2     08-17    140  |  105  |  10  ----------------------------<  134<H>  3.5   |  23  |  1.00    Ca    9.3      17 Aug 2023 06:31  Mg     2.1     08-16    TPro  7.6  /  Alb  3.6  /  TBili  1.2  /  DBili  x   /  AST  22  /  ALT  29  /  AlkPhos  97  08-17    I&O's Summary    BNP    RADIOLOGY & ADDITIONAL STUDIES:    ECHO:    impression  tn 12.4 tel rsr ef 55-60 mpi neg for ischemia. discussed with dr mcarthur. and son at bedside. jim will follow with [facility] cardio in [facility] limitations of stress test discussed in detail consider angiography if clinically warranted

## 2023-08-17 NOTE — DISCHARGE NOTE PROVIDER - HOSPITAL COURSE
67M hx of HTN presented to Ed on 8/15/23 c/o episodes of chest pain at rest x 5 days. In ED, afebrile P: 78 BP: 157/76 sat 100% on RA WBC: 14.87 59%N Hgb: 13.6 Cr: 1.17 d-dimer neg trop: 4.7 repeat 5.7 ma.2 BNP: 72. EKG showed NSR. CXR with no pulmonary disease. Pt was admitted for ACS work up. Continued to trend Trop, which was negative x3 (4.7 > 5.7 > 9.4). Repeat EKG continued to show NSR. EKG with LVEF 65-70% and no wall motion abnormality. Cardiology was consulted, recommended stress test, which showed.........Home med metoprolol and losartan was continued during the hospitalization. On the day of discharge, pt is medically stable to be discharged back home.     Vital Signs Last 24 Hrs  T(C): 36.7 (17 Aug 2023 12:57), Max: 36.9 (17 Aug 2023 05:10)  T(F): 98 (17 Aug 2023 12:57), Max: 98.5 (17 Aug 2023 05:10)  HR: 83 (17 Aug 2023 12:57) (80 - 91)  BP: 135/84 (17 Aug 2023 12:57) (135/84 - 184/83)  BP(mean): 117 (17 Aug 2023 05:10) (117 - 117)  RR: 17 (17 Aug 2023 12:57) (16 - 18)  SpO2: 97% (17 Aug 2023 12:57) (97% - 98%)    O2 Parameters below as of 17 Aug 2023 12:57  Patient On (Oxygen Delivery Method): room air    PHYSICAL EXAM:  GENERAL: NAD, obese  HEAD:  Normocephalic  EYES: EOMI, conjunctiva and sclera clear  ENMT:  Moist mucous membranes, No lesions  NECK: Supple, No JVD, no bruit,   NERVOUS SYSTEM:  Alert & Oriented X3, Good concentration; grossly  moves all fours; DTRs 2+ intact and symmetric  CHEST/LUNG: Clear to auscultation bilaterally; No rales, rhonchi, wheezing, or rubs  HEART: Regular rate and rhythm; No murmurs, rubs, or gallops  ABDOMEN: Soft, Nontender, Nondistended; Bowel sounds present  EXTREMITIES:  2+ Peripheral Pulses, No clubbing, cyanosis, or edema  SKIN: No rashes or lesions             67M hx of HTN presented to Ed on 8/15/23 c/o episodes of chest pain (~30min) after eating associated with SOB, nausea, mild diaphoresis, and weakness x 5 days. In ED, afebrile P: 78 BP: 157/76 sat 100% on RA WBC: 14.87 59%N Hgb: 13.6 Cr: 1.17 d-dimer neg trop: 4.7 repeat 5.7 ma.2 BNP: 72. EKG showed NSR. CXR with no pulmonary disease. Pt was admitted for ACS work up. Continued to trend Trop, which was negative x3 (4.7 > 5.7 > 9.4). Repeat EKG continued to show NSR. EKG with LVEF 65-70% and no wall motion abnormality. Cardiology was consulted, recommended stress test, which showed......... Home med metoprolol and losartan was continued during the hospitalization. On the day of discharge, pt is medically stable to be discharged back home.     Vital Signs Last 24 Hrs  T(C): 36.7 (17 Aug 2023 12:57), Max: 36.9 (17 Aug 2023 05:10)  T(F): 98 (17 Aug 2023 12:57), Max: 98.5 (17 Aug 2023 05:10)  HR: 83 (17 Aug 2023 12:57) (80 - 91)  BP: 135/84 (17 Aug 2023 12:57) (135/84 - 184/83)  BP(mean): 117 (17 Aug 2023 05:10) (117 - 117)  RR: 17 (17 Aug 2023 12:57) (16 - 18)  SpO2: 97% (17 Aug 2023 12:57) (97% - 98%)    O2 Parameters below as of 17 Aug 2023 12:57  Patient On (Oxygen Delivery Method): room air    PHYSICAL EXAM:  GENERAL: NAD, obese  HEAD:  Normocephalic  EYES: EOMI, conjunctiva and sclera clear  ENMT:  Moist mucous membranes, No lesions  NECK: Supple, No JVD, no bruit,   NERVOUS SYSTEM:  Alert & Oriented X3, Good concentration; grossly  moves all fours; DTRs 2+ intact and symmetric  CHEST/LUNG: Clear to auscultation bilaterally; No rales, rhonchi, wheezing, or rubs  HEART: Regular rate and rhythm; No murmurs, rubs, or gallops  ABDOMEN: Soft, Nontender, Nondistended; Bowel sounds present  EXTREMITIES:  2+ Peripheral Pulses, No clubbing, cyanosis, or edema  SKIN: No rashes or lesions             67M hx of HTN presented to Ed on 8/15/23 c/o episodes of chest pain (~30min) after eating associated with SOB, nausea, mild diaphoresis, and weakness x 5 days. In ED, afebrile P: 78 BP: 157/76 sat 100% on RA WBC: 14.87 59%N Hgb: 13.6 Cr: 1.17 d-dimer neg trop: 4.7 repeat 5.7 ma.2 BNP: 72. EKG showed NSR. CXR with no pulmonary disease. Pt was admitted for ACS work up. Continued to trend Trop, which was negative x3 (4.7 > 5.7 > 9.4). Repeat EKG continued to show NSR. EKG with LVEF 65-70% and no wall motion abnormality. Cardiology was consulted, recommended nuclear stress test, which was negative. Home med metoprolol and losartan was continued during the hospitalization. On the day of discharge, pt is medically stable to be discharged back home.     Vital Signs Last 24 Hrs  T(C): 36.7 (17 Aug 2023 12:57), Max: 36.9 (17 Aug 2023 05:10)  T(F): 98 (17 Aug 2023 12:57), Max: 98.5 (17 Aug 2023 05:10)  HR: 83 (17 Aug 2023 12:57) (80 - 91)  BP: 135/84 (17 Aug 2023 12:57) (135/84 - 184/83)  BP(mean): 117 (17 Aug 2023 05:10) (117 - 117)  RR: 17 (17 Aug 2023 12:57) (16 - 18)  SpO2: 97% (17 Aug 2023 12:57) (97% - 98%)    O2 Parameters below as of 17 Aug 2023 12:57  Patient On (Oxygen Delivery Method): room air    PHYSICAL EXAM:  GENERAL: NAD, obese  HEAD:  Normocephalic  EYES: EOMI, conjunctiva and sclera clear  ENMT:  Moist mucous membranes, No lesions  NECK: Supple, No JVD, no bruit,   NERVOUS SYSTEM:  Alert & Oriented X3, Good concentration; grossly  moves all fours; DTRs 2+ intact and symmetric  CHEST/LUNG: Clear to auscultation bilaterally; No rales, rhonchi, wheezing, or rubs  HEART: Regular rate and rhythm; No murmurs, rubs, or gallops  ABDOMEN: Soft, Nontender, Nondistended; Bowel sounds present  EXTREMITIES:  2+ Peripheral Pulses, No clubbing, cyanosis, or edema  SKIN: No rashes or lesions

## 2023-08-17 NOTE — DISCHARGE NOTE PROVIDER - CARE PROVIDER_API CALL
Jenni, Tony  Phone: [phone]  Fax: (   )    -  Follow Up Time: 1 week   Tony Arteaga  Phone: [phone]  Fax: (   )    -  Follow Up Time: 1 week    Wilian Mckinley  Cardiology  [address], [address]  [place] [address]  Phone: [phone]  Fax: [phone]  Follow Up Time: 1 week

## 2023-08-17 NOTE — DISCHARGE NOTE PROVIDER - NSDCMRMEDTOKEN_GEN_ALL_CORE_FT
gabapentin 300 mg oral capsule: 1 cap(s) orally 2 times a day  losartan 50 mg oral tablet: 1 orally once a day  metoprolol succinate 100 mg oral capsule, extended release: 1 orally once a day   gabapentin 300 mg oral capsule: 1 cap(s) orally 2 times a day  losartan 50 mg oral tablet: 1 orally once a day  metoprolol succinate 100 mg oral capsule, extended release: 1 orally once a day  pantoprazole 40 mg oral delayed release tablet: 1 tab(s) orally once a day (before a meal)

## 2023-08-17 NOTE — DISCHARGE NOTE PROVIDER - CARE PROVIDERS DIRECT ADDRESSES
,DirectAddress_Unknown ,DirectAddress_Unknown,[email or web address]

## 2023-08-17 NOTE — DISCHARGE NOTE PROVIDER - PROVIDER TOKENS
FREE:[LAST:[Yousef],FIRST:[Tony],PHONE:[phone]],FAX:[(   )    -],FOLLOWUP:[1 week]] FREE:[LAST:[Yousef],FIRST:[Tony],PHONE:[phone]],FAX:[(   )    -],FOLLOWUP:[1 week]],PROVIDER:[TOKEN:[[id number]],FOLLOWUP:[1 week]]

## 2023-08-17 NOTE — DISCHARGE NOTE PROVIDER - NSDCCPCAREPLAN_GEN_ALL_CORE_FT
PRINCIPAL DISCHARGE DIAGNOSIS  Diagnosis: Chest pain  Assessment and Plan of Treatment: You were hospitalized due to chest pain concerning for acute coronay syndrome. The lab work and imaging studies, including nuclear stress test, did not indicate you have acute coronary syndrome. Continue taking pentaprozole as indicated. Please follow up with your primary care provider for further managment.      SECONDARY DISCHARGE DIAGNOSES  Diagnosis: Hypertension  Assessment and Plan of Treatment: Continue taking metoprolol and losartan as directed. Follow up with your primary provider for further management.     PRINCIPAL DISCHARGE DIAGNOSIS  Diagnosis: Chest pain  Assessment and Plan of Treatment: You were hospitalized due to chest pain concerning for acute coronay syndrome. The lab work and imaging studies, including nuclear stress test, did not indicate you have acute coronary syndrome. Continue taking pentaprozole as indicated. Please follow up with your primary care provider and cardiologist for further managment.  SEEK IMMEDIATE MEDICAL CARE IF YOU HAVE THE FOLLOWING SYMPTOMS: worsening chest pain, coughing up blood, unexplained back/neck/jaw pain, severe abdominal pain, dizziness or lightheadedness, shortness of breath, sweaty or clammy skin, vomiting, or racing heart beat. These symptoms may represent a serious problem that is an emergency. Do not wait to see if the symptoms will go away. Get medical help right away. Call your local emergency services (911 in the [place] Do not drive yourself to the hospital.        SECONDARY DISCHARGE DIAGNOSES  Diagnosis: Hypertension  Assessment and Plan of Treatment: Continue taking metoprolol and losartan as directed. Follow up with your primary provider for further management.

## 2023-09-11 RX ORDER — METOPROLOL TARTRATE 50 MG
1 TABLET ORAL
Refills: 0 | DISCHARGE

## 2023-09-11 RX ORDER — GABAPENTIN 400 MG/1
1 CAPSULE ORAL
Refills: 0 | DISCHARGE

## 2023-09-11 RX ORDER — LOSARTAN POTASSIUM 100 MG/1
1 TABLET, FILM COATED ORAL
Refills: 0 | DISCHARGE

## 2023-10-02 PROBLEM — Z00.00 ENCOUNTER FOR PREVENTIVE HEALTH EXAMINATION: Status: ACTIVE | Noted: 2023-10-02

## 2023-10-11 ENCOUNTER — APPOINTMENT (OUTPATIENT)
Dept: SPINE | Facility: CLINIC | Age: 68
End: 2023-10-11

## 2024-03-06 NOTE — ED PROVIDER NOTE - WET READ LAUNCH FT
Pt arrived via triage.  Pt c/o dental pain and difficulty swallowing after having 5 wisdom teeth pulled on last Thursday.  No medical hx, no travel/suspected exposure.  Pt also states unable to swallow, spitting and having nausea.  Symptoms started yesterday.  No travel/suspected exposure, A&Ox4.   There are no Wet Read(s) to document. There is 1 Wet Read(s) to document.

## 2024-03-29 PROBLEM — R06.02 SHORTNESS OF BREATH: Chronic | Status: ACTIVE | Noted: 2023-08-15

## 2024-04-09 ENCOUNTER — APPOINTMENT (OUTPATIENT)
Dept: NEUROLOGY | Facility: CLINIC | Age: 69
End: 2024-04-09
Payer: MEDICARE

## 2024-04-09 VITALS
HEIGHT: 67 IN | BODY MASS INDEX: 28.56 KG/M2 | DIASTOLIC BLOOD PRESSURE: 89 MMHG | HEART RATE: 72 BPM | WEIGHT: 182 LBS | SYSTOLIC BLOOD PRESSURE: 177 MMHG

## 2024-04-09 DIAGNOSIS — Z78.9 OTHER SPECIFIED HEALTH STATUS: ICD-10-CM

## 2024-04-09 DIAGNOSIS — Z86.79 PERSONAL HISTORY OF OTHER DISEASES OF THE CIRCULATORY SYSTEM: ICD-10-CM

## 2024-04-09 DIAGNOSIS — M54.16 RADICULOPATHY, LUMBAR REGION: ICD-10-CM

## 2024-04-09 PROCEDURE — G2211 COMPLEX E/M VISIT ADD ON: CPT

## 2024-04-09 PROCEDURE — 99205 OFFICE O/P NEW HI 60 MIN: CPT

## 2024-04-09 NOTE — REASON FOR VISIT
[Consultation] : a consultation visit [Family Member] : family member [FreeTextEntry1] : lumbar radiculopathy

## 2024-04-09 NOTE — DISCUSSION/SUMMARY
[FreeTextEntry1] : 68-year-old gentleman who sustained lumbar disc herniation at the level of L5 is here for initial consultation.  Patient continues to have symptoms localized to the L5 region however it is not clear if it is residuals from his previous radiculopathy versus new symptoms.  Will repeat an MRI of the lumbar spine.  Patient was advised to continue blood pressure medications and to follow-up with his PMD.  Further management of the lumbar radiculopathy pending review of imaging.  I spent the time noted on the day of this patient encounter preparing for, review of medical records,review of pertinent diagnostic studies, providing and documenting the above E/M service and counseling and educate patient on differential, workup, disease course, and treatment/management. Education was provided to the patient during this encounter. All questions and concerns were answered and addressed in detail. The patient verbalized understanding and agreed to plan. Patient was advised to continue to monitor for neurologic symptoms and to notify my office or go to the nearest emergency room if there are any changes. Any orders/referrals and communications were provided as well. Side effects of the above medications were discussed in detail including but not limited to applicable black box warning and teratogenicity as appropriate. Patient was advised to bring previous records to my office.

## 2024-04-09 NOTE — HISTORY OF PRESENT ILLNESS
[FreeTextEntry1] : 68-year-old gentleman who around July 20 23rd lifted a heavy box and soon had symptoms of disc herniation including diaphoresis and back pain.  Patient was brought by ambulance to near [facility] where MRI showed disc herniation at the level of L5 and no intervention was recommended but he was sent for physical therapy.  Patient has improved but recently came back from a 4-month trip to [facility].  Patient continues to have burning sensation in the right foot and lateral calf.  This may be residuals from the disc herniation.  Patient is also noted to have blood pressure 177/89.  It appears that over the past couple of days patient has stopped taking the blood pressure medication because he did not feel that he needed it.  Patient was advised that he should continue the blood pressure medications as untreated hypertension may cause strokes and heart attacks.

## 2024-04-09 NOTE — PHYSICAL EXAM
[General Appearance - Alert] : alert [Oriented To Time, Place, And Person] : oriented to person, place, and time [Person] : oriented to person [Place] : oriented to place [Time] : oriented to time [Short Term Intact] : short term memory intact [Fluency] : fluency intact [Cranial Nerves Optic (II)] : visual acuity intact bilaterally,  visual fields full to confrontation, pupils equal round and reactive to light [Cranial Nerves Oculomotor (III)] : extraocular motion intact [Cranial Nerves Trigeminal (V)] : facial sensation intact symmetrically [Cranial Nerves Facial (VII)] : face symmetrical [Cranial Nerves Vestibulocochlear (VIII)] : hearing was intact bilaterally [Cranial Nerves Accessory (XI - Cranial And Spinal)] : head turning and shoulder shrug symmetric [Cranial Nerves Hypoglossal (XII)] : there was no tongue deviation with protrusion [Motor Tone] : muscle tone was normal in all four extremities [Motor Strength] : muscle strength was normal in all four extremities [Sensation Tactile Decrease] : light touch was intact [Abnormal Walk] : normal gait [Coordination - Dysmetria Impaired Finger-to-Nose Bilateral] : not present [1+] : Biceps left 1+ [0] : Patella left 0

## 2024-05-29 ENCOUNTER — APPOINTMENT (OUTPATIENT)
Dept: MRI IMAGING | Facility: CLINIC | Age: 69
End: 2024-05-29

## 2024-05-31 ENCOUNTER — APPOINTMENT (OUTPATIENT)
Dept: NEUROLOGY | Facility: CLINIC | Age: 69
End: 2024-05-31